# Patient Record
Sex: FEMALE | Race: WHITE | Employment: OTHER | URBAN - METROPOLITAN AREA
[De-identification: names, ages, dates, MRNs, and addresses within clinical notes are randomized per-mention and may not be internally consistent; named-entity substitution may affect disease eponyms.]

---

## 2022-11-28 ENCOUNTER — HOSPITAL ENCOUNTER (EMERGENCY)
Age: 62
Discharge: HOME | End: 2022-11-29
Attending: EMERGENCY MEDICINE

## 2022-11-28 VITALS
OXYGEN SATURATION: 98 % | DIASTOLIC BLOOD PRESSURE: 81 MMHG | WEIGHT: 160 LBS | RESPIRATION RATE: 18 BRPM | SYSTOLIC BLOOD PRESSURE: 154 MMHG | TEMPERATURE: 98.3 F | HEART RATE: 89 BPM | HEIGHT: 62 IN | BODY MASS INDEX: 29.44 KG/M2

## 2022-11-28 DIAGNOSIS — R55 SYNCOPE AND COLLAPSE: Primary | ICD-10-CM

## 2022-11-28 PROCEDURE — 36415 COLL VENOUS BLD VENIPUNCTURE: CPT

## 2022-11-28 PROCEDURE — 84484 ASSAY OF TROPONIN QUANT: CPT

## 2022-11-28 PROCEDURE — 99284 EMERGENCY DEPT VISIT MOD MDM: CPT

## 2022-11-28 PROCEDURE — 96360 HYDRATION IV INFUSION INIT: CPT

## 2022-11-28 PROCEDURE — 85025 COMPLETE CBC W/AUTO DIFF WBC: CPT

## 2022-11-28 PROCEDURE — 93005 ELECTROCARDIOGRAM TRACING: CPT

## 2022-11-28 PROCEDURE — 80053 COMPREHEN METABOLIC PANEL: CPT

## 2022-11-28 RX ORDER — SODIUM CHLORIDE 0.9 % (FLUSH) 0.9 %
5-40 SYRINGE (ML) INJECTION AS NEEDED
Status: DISCONTINUED | OUTPATIENT
Start: 2022-11-28 | End: 2022-11-29 | Stop reason: HOSPADM

## 2022-11-28 RX ORDER — LEVOTHYROXINE SODIUM 100 UG/1
TABLET ORAL
COMMUNITY

## 2022-11-28 RX ORDER — SODIUM CHLORIDE 0.9 % (FLUSH) 0.9 %
5-40 SYRINGE (ML) INJECTION EVERY 8 HOURS
Status: DISCONTINUED | OUTPATIENT
Start: 2022-11-28 | End: 2022-11-29 | Stop reason: HOSPADM

## 2022-11-29 LAB
ALBUMIN SERPL-MCNC: 4.3 G/DL (ref 3.5–5)
ALBUMIN/GLOB SERPL: 1.1 {RATIO} (ref 1.1–2.2)
ALP SERPL-CCNC: 86 U/L (ref 45–117)
ALT SERPL-CCNC: 33 U/L (ref 12–78)
ANION GAP SERPL CALC-SCNC: 4 MMOL/L (ref 5–15)
AST SERPL-CCNC: 30 U/L (ref 15–37)
BASOPHILS # BLD: 0.1 K/UL (ref 0–0.1)
BASOPHILS NFR BLD: 1 % (ref 0–1)
BILIRUB SERPL-MCNC: 0.5 MG/DL (ref 0.2–1)
BUN SERPL-MCNC: 20 MG/DL (ref 6–20)
BUN/CREAT SERPL: 19 (ref 12–20)
CALCIUM SERPL-MCNC: 9.6 MG/DL (ref 8.5–10.1)
CHLORIDE SERPL-SCNC: 106 MMOL/L (ref 97–108)
CO2 SERPL-SCNC: 27 MMOL/L (ref 21–32)
COMMENT, HOLDF: NORMAL
CREAT SERPL-MCNC: 1.03 MG/DL (ref 0.55–1.02)
DIFFERENTIAL METHOD BLD: ABNORMAL
EOSINOPHIL # BLD: 0.2 K/UL (ref 0–0.4)
EOSINOPHIL NFR BLD: 1 % (ref 0–7)
ERYTHROCYTE [DISTWIDTH] IN BLOOD BY AUTOMATED COUNT: 12.1 % (ref 11.5–14.5)
GLOBULIN SER CALC-MCNC: 3.9 G/DL (ref 2–4)
GLUCOSE SERPL-MCNC: 147 MG/DL (ref 65–100)
HCT VFR BLD AUTO: 40.9 % (ref 35–47)
HGB BLD-MCNC: 13.6 G/DL (ref 11.5–16)
IMM GRANULOCYTES # BLD AUTO: 0.1 K/UL (ref 0–0.04)
IMM GRANULOCYTES NFR BLD AUTO: 0 % (ref 0–0.5)
LYMPHOCYTES # BLD: 1.6 K/UL (ref 0.8–3.5)
LYMPHOCYTES NFR BLD: 12 % (ref 12–49)
MCH RBC QN AUTO: 30 PG (ref 26–34)
MCHC RBC AUTO-ENTMCNC: 33.3 G/DL (ref 30–36.5)
MCV RBC AUTO: 90.1 FL (ref 80–99)
MONOCYTES # BLD: 0.6 K/UL (ref 0–1)
MONOCYTES NFR BLD: 5 % (ref 5–13)
NEUTS SEG # BLD: 10.8 K/UL (ref 1.8–8)
NEUTS SEG NFR BLD: 81 % (ref 32–75)
NRBC # BLD: 0 K/UL (ref 0–0.01)
NRBC BLD-RTO: 0 PER 100 WBC
PLATELET # BLD AUTO: 307 K/UL (ref 150–400)
PMV BLD AUTO: 10.4 FL (ref 8.9–12.9)
POTASSIUM SERPL-SCNC: 4.2 MMOL/L (ref 3.5–5.1)
PROT SERPL-MCNC: 8.2 G/DL (ref 6.4–8.2)
RBC # BLD AUTO: 4.54 M/UL (ref 3.8–5.2)
SAMPLES BEING HELD,HOLD: NORMAL
SODIUM SERPL-SCNC: 137 MMOL/L (ref 136–145)
TROPONIN-HIGH SENSITIVITY: 6 NG/L (ref 0–51)
WBC # BLD AUTO: 13.3 K/UL (ref 3.6–11)

## 2022-11-29 PROCEDURE — 96360 HYDRATION IV INFUSION INIT: CPT

## 2022-11-29 PROCEDURE — 74011250636 HC RX REV CODE- 250/636: Performed by: EMERGENCY MEDICINE

## 2022-11-29 RX ADMIN — SODIUM CHLORIDE 1000 ML: 9 INJECTION, SOLUTION INTRAVENOUS at 00:17

## 2022-11-29 NOTE — ED PROVIDER NOTES
History of thyroid problems. She presents accompanied by her  after a syncopal episode. She states that approximately 4 hours ago she began to have GI upset. She developed stomach cramping and had the urge to move her bowels. She ended up having diarrhea. At about the same time, she became nauseated. She reached over to grab the trash can while she was sitting on the toilet and had an apparent syncopal event. She feels like she came around pretty quickly. She did strike her left forehead. She has a mild headache. Her stomach feels better at present. She still feels \"weak. \"  She denies a history of syncope. She has had good p.o. intake. No fever or cough. She denies a history of cardiac problems. Her  states she has been acting normally. No past medical history on file. No past surgical history on file. No family history on file. Social History     Socioeconomic History    Marital status:      Spouse name: Not on file    Number of children: Not on file    Years of education: Not on file    Highest education level: Not on file   Occupational History    Not on file   Tobacco Use    Smoking status: Not on file    Smokeless tobacco: Not on file   Substance and Sexual Activity    Alcohol use: Not on file    Drug use: Not on file    Sexual activity: Not on file   Other Topics Concern    Not on file   Social History Narrative    Not on file     Social Determinants of Health     Financial Resource Strain: Not on file   Food Insecurity: Not on file   Transportation Needs: Not on file   Physical Activity: Not on file   Stress: Not on file   Social Connections: Not on file   Intimate Partner Violence: Not on file   Housing Stability: Not on file         ALLERGIES: Patient has no known allergies. Review of Systems   All other systems reviewed and are negative.     Vitals:    11/28/22 2314   BP: (!) 154/81   Pulse: 89   Resp: 18   Temp: 98.3 °F (36.8 °C)   SpO2: 98%   Weight: 72.6 kg (160 lb)   Height: 5' 2\" (1.575 m)            Physical Exam  Vitals and nursing note reviewed. Constitutional:       Appearance: She is well-developed. HENT:      Head: Normocephalic. Comments: Left forehead redness. Eyes:      Conjunctiva/sclera: Conjunctivae normal.      Pupils: Pupils are equal, round, and reactive to light. Neck:      Trachea: No tracheal deviation. Cardiovascular:      Rate and Rhythm: Normal rate and regular rhythm. Heart sounds: Normal heart sounds. No murmur heard. No friction rub. No gallop. Pulmonary:      Effort: Pulmonary effort is normal.      Breath sounds: Normal breath sounds. Abdominal:      Palpations: Abdomen is soft. Tenderness: There is no abdominal tenderness. Musculoskeletal:         General: No deformity. Cervical back: Neck supple. Skin:     General: Skin is warm and dry. Neurological:      Mental Status: She is alert. Comments: oriented        MDM         Procedures    EKG: Normal sinus rhythm; rate of 78; nonspecific ST, T wave abnormalities. Jean Hogue MD  11:55 PM    Progress Note:  Results, treatment, and follow up plan have been discussed with patient. Questions were answered. Jean Hogue MD  12:41 AM    Assessment/plan: Syncope -consistent with vasovagal reaction. Reassuring appearance/exam with stable vital signs. CBC remarkable for mild leukocytosis. CMP remarkable for mild hyperglycemia. EKG nonspecific. Troponin negative. Home with PCP follow-up. She received a liter of fluids in the ED. Return precautions.   Jean Hogue MD  12:43 AM

## 2022-11-29 NOTE — ED TRIAGE NOTES
Pt states she felt weird all day and was having a BM around 2000 with severe abdominal cramping, reached for the trash can because she was feeling like she was going to vomit and was reaching for a trash can and fell off the toilet. Denies thinners.   PMH: hypothyroidism

## 2022-12-01 LAB
ATRIAL RATE: 78 BPM
CALCULATED P AXIS, ECG09: 49 DEGREES
CALCULATED R AXIS, ECG10: 1 DEGREES
CALCULATED T AXIS, ECG11: 18 DEGREES
DIAGNOSIS, 93000: NORMAL
P-R INTERVAL, ECG05: 162 MS
Q-T INTERVAL, ECG07: 382 MS
QRS DURATION, ECG06: 76 MS
QTC CALCULATION (BEZET), ECG08: 435 MS
VENTRICULAR RATE, ECG03: 78 BPM

## 2023-05-25 RX ORDER — LEVOTHYROXINE SODIUM 0.1 MG/1
TABLET ORAL
COMMUNITY

## 2024-06-03 ENCOUNTER — OFFICE VISIT (OUTPATIENT)
Age: 64
End: 2024-06-03

## 2024-06-03 VITALS
OXYGEN SATURATION: 97 % | BODY MASS INDEX: 29.44 KG/M2 | HEIGHT: 62 IN | WEIGHT: 160 LBS | TEMPERATURE: 98.1 F | HEART RATE: 79 BPM | DIASTOLIC BLOOD PRESSURE: 72 MMHG | SYSTOLIC BLOOD PRESSURE: 128 MMHG | RESPIRATION RATE: 18 BRPM

## 2024-06-03 DIAGNOSIS — S69.92XA INJURY OF FINGER OF LEFT HAND, INITIAL ENCOUNTER: Primary | ICD-10-CM

## 2024-06-03 NOTE — PATIENT INSTRUCTIONS
Consider follow up with Orthopedics if pain continues. Degenerative changes noted on xray:    Findings:  There is no acute fracture or dislocation. Chronic appearing flattening of the   distal radius and loss of radiocarpal joint space. There is loss of intercarpal   spaces.      Moderate degenerative changes of the visualized joint spaces.      Decreased bony mineralization suspicious for osteopenia.      There is soft tissue swelling centered about the hand, most notably about the   fifth PIP joint.     IMPRESSION:     Chronic appearing flattening of the distal radius and loss of radiocarpal jont   space.   No radiographic evidence of acute osseous abnormality.

## 2024-06-03 NOTE — PROGRESS NOTES
Caren Wright (:  1960) is a 64 y.o. female,New patient, here for evaluation of the following chief complaint(s):  Other (Possible broken pinky finger left hand) and Finger Injury (Possible broken pinky fracture)      ASSESSMENT/PLAN:  Visit Diagnoses and Associated Orders       Injury of finger of left hand, initial encounter    -  Primary    XR HAND LEFT (MIN 3 VIEWS) [28333 CPT(R)]   - Future Order             Findings:  There is no acute fracture or dislocation. Chronic appearing flattening of the   distal radius and loss of radiocarpal jont space.  There is loss of intercarpal   spaces.      Moderate degenerative changes of the visualized joint spaces.      Decreased bony mineralization suspicious for osteopenia.      There is soft tissue swelling centered about the hand, most notably about the   fifth PIP joint.     IMPRESSION:     Chronic appearing flattening of the distal radius and loss of radiocarpal jont   space.   No radiographic evidence of acute osseous abnormality.      Follow up in 7 days if symptoms persist or if symptoms worsen.  RICE    SUBJECTIVE/OBJECTIVE:    64 y.o. female presents with symptoms of possible broken pinky finger on left hand. She tripped over her dog and caused her to hit the door frame. She also hit her head. She did not loose consciousness. She did not have to take anything for pain when she hit her head. She is AxO to person, place, time and situation.          Vitals:    24 1323   BP: 128/72   Site: Right Upper Arm   Position: Sitting   Cuff Size: Small Adult   Pulse: 79   Resp: 18   Temp: 98.1 °F (36.7 °C)   TempSrc: Temporal   SpO2: 97%   Weight: 72.6 kg (160 lb)   Height: 1.575 m (5' 2\")       No results found for this visit on 24.     Physical Exam  Vitals and nursing note reviewed.   Constitutional:       General: She is not in acute distress.     Appearance: Normal appearance.   HENT:      Head: Normocephalic and atraumatic.      Right Ear: Tympanic